# Patient Record
(demographics unavailable — no encounter records)

---

## 2024-12-27 NOTE — PHYSICAL EXAM
[No Acute Distress] : no acute distress [Well Nourished] : well nourished [Well Developed] : well developed [Well-Appearing] : well-appearing [Normal Voice/Communication] : normal voice/communication [Normal Sclera/Conjunctiva] : normal sclera/conjunctiva [PERRL] : pupils equal round and reactive to light [EOMI] : extraocular movements intact [Normal Outer Ear/Nose] : the outer ears and nose were normal in appearance [Normal Oropharynx] : the oropharynx was normal [Normal TMs] : both tympanic membranes were normal [Normal Nasal Mucosa] : the nasal mucosa was normal [No JVD] : no jugular venous distention [No Lymphadenopathy] : no lymphadenopathy [Supple] : supple [Thyroid Normal, No Nodules] : the thyroid was normal and there were no nodules present [No Respiratory Distress] : no respiratory distress  [No Accessory Muscle Use] : no accessory muscle use [Clear to Auscultation] : lungs were clear to auscultation bilaterally [No Carotid Bruits] : no carotid bruits [No Abdominal Bruit] : a ~M bruit was not heard ~T in the abdomen [No Varicosities] : no varicosities [Pedal Pulses Present] : the pedal pulses are present [No Edema] : there was no peripheral edema [No Palpable Aorta] : no palpable aorta [No Extremity Clubbing/Cyanosis] : no extremity clubbing/cyanosis [Soft] : abdomen soft [Non Tender] : non-tender [Non-distended] : non-distended [No Masses] : no abdominal mass palpated [No HSM] : no HSM [Normal Bowel Sounds] : normal bowel sounds [Normal Supraclavicular Nodes] : no supraclavicular lymphadenopathy [Normal Posterior Cervical Nodes] : no posterior cervical lymphadenopathy [Normal Anterior Cervical Nodes] : no anterior cervical lymphadenopathy [No CVA Tenderness] : no CVA  tenderness [No Spinal Tenderness] : no spinal tenderness [No Joint Swelling] : no joint swelling [Grossly Normal Strength/Tone] : grossly normal strength/tone [No Rash] : no rash [Coordination Grossly Intact] : coordination grossly intact [No Focal Deficits] : no focal deficits [Normal Gait] : normal gait [Deep Tendon Reflexes (DTR)] : deep tendon reflexes were 2+ and symmetric [Normal Affect] : the affect was normal [Normal Insight/Judgement] : insight and judgment were intact [Comprehensive Foot Exam Normal] : Right and left foot were examined and both feet are normal. No ulcers in either foot. Toes are normal and with full ROM.  Normal tactile sensation with monofilament testing throughout both feet [de-identified] : Tachycardia. [de-identified] : Small palpable mass, left thigh, mobile, non-fixed, appears to be a lipoma. Patient noticed a lesion approximately 2 months ago. It has not changed in size or character [de-identified] : non focal

## 2024-12-27 NOTE — HISTORY OF PRESENT ILLNESS
[FreeTextEntry1] : Patient here to reestablish himself as a patient patient [facility] between [place] in [place] states that he had a cardiac workup in [place] supposedly has some type of valvular leak. [de-identified] : Patient is a 68-year-old gentleman who presents today to reestablish himself as a patient last seen February 1, 2020 greater than 4 years ago.  Medical history significant for diabetes mellitus type 2, hyperlipidemia, hypertension, gastroesophageal reflux.  Presently the patient is awake alert and oriented x 3 in no acute distress, cooperative.

## 2024-12-27 NOTE — ASSESSMENT
[FreeTextEntry1] : Assessment and plan:  1.  Tachycardia patient's heart rate remained consistently above 160 and electrocardiogram was done and it showed tachycardia with what appears to be a right bundle branch block.  I have not seen the patient has been greater than 4 years he resides part-time in [place] and part-time in [place] approximately 3 months at a time in both places.  He is being followed by primary care physician in [place] and also had cardiac workup in the past patient informs me that he has had tachycardia for approximately 3 years but yet his medications do not reflect any type of treatment.  I recommended patient be seen by cardiology and I also offered to have the patient evaluated in the emergency department patient is scheduled to leave this Monday for [place].  He declines any type of hospitalization at this time he said that he has an appointment upcoming early in January with a cardiologist the [place].  Patient is totally asymptomatic he denies any chest pain he denies shortness of breath he denies any nausea or vomiting he denies any neck arm or jaw discomfort.  For completeness sake I did prescribe metoprolol succinate 25 mg to help slow the heart rate patient is willing to start medications he is going to pick it up immediately and take his first pill tonight.  Instructions were given should he develop any type of chest pain or a subjective sense of feeling his heartbeat he needs to go directly to the emergency department.  Patient is agreeable but he declines any intervention at this time.  2.  Hyperlipidemia discussed with patient low-fat low-cholesterol diet patient in the past was on atorvastatin he says that he is presently on Lipitor which is the same medication therefore continue present medical management.  3.  Diabetes mellitus type 2 continue Jardiance and home blood sugar monitoring  4.  Comprehensive blood work drawn in office by examiner, electrocardiogram done in office and interpreted copy was given to patient for him to give to his cardiologist when he sees his cardiologist in a week or 2.  I had a detailed discussion with patient regarding if he develops any type of symptom secondary to the tachycardia he is to go immediately to the emergency room patient is agreeable but he says he will not go now because he is leaving Monday for [place].  Total time spent face-to-face and non-face-to-face time 70 minutes the majority of which was spent on counseling and coordination of care.

## 2024-12-27 NOTE — HEALTH RISK ASSESSMENT
[Fair] : ~his/her~ current health as fair  [Good] : ~his/her~  mood as  good [Yes] : Yes [Monthly or less (1 pt)] : Monthly or less (1 point) [1 or 2 (0 pts)] : 1 or 2 (0 points) [Never (0 pts)] : Never (0 points) [No] : In the past 12 months have you used drugs other than those required for medical reasons? No [Any fall with injury in past year] : Patient reported fall with injury in the past year [Little interest or pleasure doing things] : 1) Little interest or pleasure doing things [Feeling down, depressed, or hopeless] : 2) Feeling down, depressed, or hopeless [0] : 2) Feeling down, depressed, or hopeless: Not at all (0) [PHQ-2 Negative - No further assessment needed] : PHQ-2 Negative - No further assessment needed [Patient declined colonoscopy] : Patient declined colonoscopy [HIV test declined] : HIV test declined [Hepatitis C test offered] : Hepatitis C test offered [None] : None [With Family] : lives with family [# of Members in Household ___] :  household currently consist of [unfilled] member(s) [Employed] : employed [Less Than High School] : less than high school [] :  [Sexually Active] : sexually active [Feels Safe at Home] : Feels safe at home [Fully functional (bathing, dressing, toileting, transferring, walking, feeding)] : Fully functional (bathing, dressing, toileting, transferring, walking, feeding) [Fully functional (using the telephone, shopping, preparing meals, housekeeping, doing laundry, using] : Fully functional and needs no help or supervision to perform IADLs (using the telephone, shopping, preparing meals, housekeeping, doing laundry, using transportation, managing medications and managing finances) [Smoke Detector] : smoke detector [Carbon Monoxide Detector] : carbon monoxide detector [Safety elements used in home] : safety elements used in home [Seat Belt] :  uses seat belt [Sunscreen] : uses sunscreen [With Patient/Caregiver] : , with patient/caregiver [Reviewed no changes] : Reviewed, no changes [Designated Healthcare Proxy] : Designated healthcare proxy [Name: ___] : Health Care Proxy's Name: [unfilled]  [Relationship: ___] : Relationship: [unfilled] [Aggressive treatment] : aggressive treatment [I will adhere to the patient's wishes.] : I will adhere to the patient's wishes. [Former] : Former [20 or more] : 20 or more [> 15 Years] : > 15 Years [NO] : No [de-identified] : Only at social events [Audit-CScore] : 1 [[id number]] : 0 [Change in mental status noted] : No change in mental status noted [Language] : denies difficulty with language [Behavior] : denies difficulty with behavior [Learning/Retaining New Information] : denies difficulty learning/retaining new information [Handling Complex Tasks] : denies difficulty handling complex tasks [Reasoning] : denies difficulty with reasoning [Spatial Ability and Orientation] : denies difficulty with spatial ability and orientation [High Risk Behavior] : no high risk behavior [Reports changes in hearing] : Reports no changes in hearing [Reports changes in vision] : Reports no changes in vision [Reports normal functional visual acuity (ie: able to read med bottle)] : Reports poor functional visual acuity.  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